# Patient Record
Sex: FEMALE | Race: ASIAN | NOT HISPANIC OR LATINO
[De-identification: names, ages, dates, MRNs, and addresses within clinical notes are randomized per-mention and may not be internally consistent; named-entity substitution may affect disease eponyms.]

---

## 2017-01-23 ENCOUNTER — APPOINTMENT (OUTPATIENT)
Age: 32
End: 2017-01-23

## 2017-01-23 ENCOUNTER — APPOINTMENT (OUTPATIENT)
Dept: UROLOGY | Facility: CLINIC | Age: 32
End: 2017-01-23

## 2017-01-23 VITALS
HEIGHT: 64 IN | WEIGHT: 144 LBS | OXYGEN SATURATION: 95 % | HEART RATE: 72 BPM | DIASTOLIC BLOOD PRESSURE: 67 MMHG | TEMPERATURE: 98.3 F | SYSTOLIC BLOOD PRESSURE: 101 MMHG | RESPIRATION RATE: 17 BRPM | BODY MASS INDEX: 24.59 KG/M2

## 2017-01-23 DIAGNOSIS — R73.03 PREDIABETES.: ICD-10-CM

## 2017-01-23 PROBLEM — Z00.00 ENCOUNTER FOR PREVENTIVE HEALTH EXAMINATION: Status: ACTIVE | Noted: 2017-01-23

## 2017-01-23 LAB
BILIRUB UR QL STRIP: NEGATIVE
CLARITY UR: CLEAR
COLLECTION METHOD: NORMAL
GLUCOSE UR-MCNC: NEGATIVE
HCG UR QL: 0.2 EU/DL
HGB UR QL STRIP.AUTO: NORMAL
KETONES UR-MCNC: NEGATIVE
LEUKOCYTE ESTERASE UR QL STRIP: NORMAL
NITRITE UR QL STRIP: NEGATIVE
PH UR STRIP: 6
PROT UR STRIP-MCNC: NEGATIVE
SP GR UR STRIP: 1.02

## 2017-01-23 RX ORDER — VITAMIN B COMPLEX
TABLET ORAL
Qty: 30 | Refills: 0 | Status: ACTIVE | COMMUNITY
Start: 2016-11-16

## 2017-01-23 RX ORDER — CYCLOBENZAPRINE HYDROCHLORIDE 10 MG/1
10 TABLET, FILM COATED ORAL
Qty: 30 | Refills: 0 | Status: ACTIVE | COMMUNITY
Start: 2016-08-24

## 2017-01-23 RX ORDER — GABAPENTIN 100 MG/1
100 CAPSULE ORAL
Qty: 90 | Refills: 0 | Status: ACTIVE | COMMUNITY
Start: 2016-09-21

## 2017-01-23 RX ORDER — MINERAL OIL AND PETROLATUM 150; 830 MG/G; MG/G
83-15 OINTMENT OPHTHALMIC
Qty: 4 | Refills: 0 | Status: ACTIVE | COMMUNITY
Start: 2016-08-24

## 2017-01-23 RX ORDER — LIDOCAINE AND PRILOCAINE 25; 25 MG/G; MG/G
2.5-2.5 CREAM TOPICAL
Qty: 30 | Refills: 0 | Status: ACTIVE | COMMUNITY
Start: 2016-10-19

## 2017-01-25 ENCOUNTER — APPOINTMENT (OUTPATIENT)
Age: 32
End: 2017-01-25

## 2017-01-25 LAB — BACTERIA UR CULT: NORMAL

## 2017-02-27 ENCOUNTER — APPOINTMENT (OUTPATIENT)
Dept: UROLOGY | Facility: CLINIC | Age: 32
End: 2017-02-27

## 2017-02-27 VITALS
HEART RATE: 78 BPM | SYSTOLIC BLOOD PRESSURE: 100 MMHG | WEIGHT: 140 LBS | DIASTOLIC BLOOD PRESSURE: 67 MMHG | BODY MASS INDEX: 23.9 KG/M2 | OXYGEN SATURATION: 97 % | RESPIRATION RATE: 17 BRPM | HEIGHT: 64 IN | TEMPERATURE: 98.8 F

## 2017-03-30 ENCOUNTER — APPOINTMENT (OUTPATIENT)
Age: 32
End: 2017-03-30

## 2017-03-30 ENCOUNTER — APPOINTMENT (OUTPATIENT)
Dept: UROLOGY | Facility: CLINIC | Age: 32
End: 2017-03-30

## 2017-03-30 VITALS
DIASTOLIC BLOOD PRESSURE: 65 MMHG | BODY MASS INDEX: 23.69 KG/M2 | OXYGEN SATURATION: 97 % | TEMPERATURE: 98.1 F | WEIGHT: 138 LBS | RESPIRATION RATE: 17 BRPM | SYSTOLIC BLOOD PRESSURE: 99 MMHG | HEART RATE: 70 BPM

## 2017-09-28 ENCOUNTER — APPOINTMENT (OUTPATIENT)
Dept: UROLOGY | Facility: CLINIC | Age: 32
End: 2017-09-28
Payer: MEDICAID

## 2017-09-28 VITALS
DIASTOLIC BLOOD PRESSURE: 65 MMHG | WEIGHT: 143 LBS | BODY MASS INDEX: 24.55 KG/M2 | TEMPERATURE: 98.3 F | RESPIRATION RATE: 18 BRPM | HEART RATE: 81 BPM | OXYGEN SATURATION: 96 % | SYSTOLIC BLOOD PRESSURE: 99 MMHG

## 2017-09-28 PROCEDURE — 99213 OFFICE O/P EST LOW 20 MIN: CPT

## 2017-09-28 PROCEDURE — 51798 US URINE CAPACITY MEASURE: CPT

## 2018-07-23 PROBLEM — R73.03 PREDIABETES: Status: RESOLVED | Noted: 2017-01-23 | Resolved: 2018-07-23

## 2018-10-04 ENCOUNTER — APPOINTMENT (OUTPATIENT)
Dept: UROLOGY | Facility: CLINIC | Age: 33
End: 2018-10-04
Payer: MEDICAID

## 2018-10-04 VITALS
OXYGEN SATURATION: 97 % | HEART RATE: 81 BPM | TEMPERATURE: 97.3 F | DIASTOLIC BLOOD PRESSURE: 68 MMHG | RESPIRATION RATE: 18 BRPM | BODY MASS INDEX: 25.75 KG/M2 | SYSTOLIC BLOOD PRESSURE: 102 MMHG | WEIGHT: 150 LBS

## 2018-10-04 PROCEDURE — 99214 OFFICE O/P EST MOD 30 MIN: CPT

## 2019-01-07 ENCOUNTER — APPOINTMENT (OUTPATIENT)
Dept: UROLOGY | Facility: CLINIC | Age: 34
End: 2019-01-07
Payer: MEDICAID

## 2019-01-07 VITALS
OXYGEN SATURATION: 98 % | HEART RATE: 99 BPM | DIASTOLIC BLOOD PRESSURE: 61 MMHG | WEIGHT: 148 LBS | TEMPERATURE: 97.9 F | RESPIRATION RATE: 17 BRPM | BODY MASS INDEX: 25.4 KG/M2 | SYSTOLIC BLOOD PRESSURE: 99 MMHG

## 2019-01-07 PROCEDURE — 51798 US URINE CAPACITY MEASURE: CPT

## 2019-01-07 PROCEDURE — 99213 OFFICE O/P EST LOW 20 MIN: CPT | Mod: 25

## 2019-07-15 ENCOUNTER — APPOINTMENT (OUTPATIENT)
Dept: UROLOGY | Facility: CLINIC | Age: 34
End: 2019-07-15
Payer: MEDICAID

## 2019-07-15 VITALS
HEART RATE: 68 BPM | RESPIRATION RATE: 17 BRPM | WEIGHT: 146 LBS | OXYGEN SATURATION: 96 % | SYSTOLIC BLOOD PRESSURE: 100 MMHG | DIASTOLIC BLOOD PRESSURE: 67 MMHG | TEMPERATURE: 98.3 F | BODY MASS INDEX: 25.06 KG/M2

## 2019-07-15 PROCEDURE — 99213 OFFICE O/P EST LOW 20 MIN: CPT

## 2019-07-15 NOTE — HISTORY OF PRESENT ILLNESS
[FreeTextEntry1] : Patient is a 34 yo F who presents for bothersome urinary frequency, nocturia.  \par \par Previously: Nocturia x5, daytime frequency.  She has reduced fluid intake at night, but without significant change. She has had sleep difficulty for years, takes gabapentin and cyclobenzaprine for back pain and to help with sleep. She states that she is not awaken from sleep to urinate, rather she is still awake and feels need to void. No dysuria, hematuria, incontinence, retention. Had pelvic exam with GYN that was normal.\par \par Interval hx:\par She was given ditropan 10 xl.  Improved nocturia from 5 to 1.  No significant SE, some dry mouth in morning but tolerating well.  She stopped ditropan for 1 year as she was attempting to conceive.  Just delivered a baby boy, in July 2018.  She also notes NICHOLAS since birth of second child.  Both were vaginal deliveries.  Scant leakage with heavy lifting etc, no use of pads.  Improved NICHOLAS.\par \par Since last visit, she stopped ditropan xl for few months.  She is still having nocturia x2-3, but off medication.  No dysuria, hematuria.  She does drink at night-time.

## 2019-07-15 NOTE — ASSESSMENT
[FreeTextEntry1] : Patient is a 32 yo F who presents with urinary frequency/nocturia.  Also with NICHOLAS.\par \par D/w pt kegels exercises for NICHOLAS\par Will cont to hold off on ACh\par F/u PRN

## 2023-08-07 ENCOUNTER — APPOINTMENT (OUTPATIENT)
Dept: UROLOGY | Facility: CLINIC | Age: 38
End: 2023-08-07
Payer: MEDICAID

## 2023-08-07 VITALS
HEIGHT: 64 IN | HEART RATE: 89 BPM | SYSTOLIC BLOOD PRESSURE: 122 MMHG | RESPIRATION RATE: 17 BRPM | OXYGEN SATURATION: 96 % | DIASTOLIC BLOOD PRESSURE: 74 MMHG | BODY MASS INDEX: 28.85 KG/M2 | TEMPERATURE: 97 F | WEIGHT: 169 LBS

## 2023-08-07 LAB
BILIRUB UR QL STRIP: NEGATIVE
CLARITY UR: CLEAR
COLLECTION METHOD: NORMAL
GLUCOSE UR-MCNC: NORMAL
HCG UR QL: 0.2 EU/DL
HGB UR QL STRIP.AUTO: NEGATIVE
KETONES UR-MCNC: NEGATIVE
LEUKOCYTE ESTERASE UR QL STRIP: NEGATIVE
NITRITE UR QL STRIP: NEGATIVE
PH UR STRIP: 5.5
PROT UR STRIP-MCNC: NEGATIVE
SP GR UR STRIP: 1.02

## 2023-08-07 PROCEDURE — 99204 OFFICE O/P NEW MOD 45 MIN: CPT | Mod: 25

## 2023-08-07 PROCEDURE — 81003 URINALYSIS AUTO W/O SCOPE: CPT | Mod: QW

## 2023-08-07 PROCEDURE — 51798 US URINE CAPACITY MEASURE: CPT

## 2023-08-07 NOTE — HISTORY OF PRESENT ILLNESS
[FreeTextEntry1] : Patient is a 38 yo F   x2 who presents for bothersome urinary frequency, nocturia, NICHOLAS.  Previously: Nocturia x5, daytime frequency. She has reduced fluid intake at night, but without significant change. She has had sleep difficulty for years, takes gabapentin and cyclobenzaprine for back pain and to help with sleep.  She was seen in 2019 for similar complaints, had nocturia x 2-5.  She was given ditropan, and had some improvement but she stopped.  She is here still with similar complaints.  No further sig back pain or use of pain/neuro meds.  She is bothered by LUTS - mainly nocturia, frequency, and feeling of need to double void.   DTF x10. She is still having nocturia x2-3.  Also reports NICHOLAS with cough, 2-3 ppds. No dysuria, retention, or hematuria.

## 2023-08-07 NOTE — ASSESSMENT
[FreeTextEntry1] : Patient is a 38 yo F who presents with urinary frequency/nocturia.  Also with NICHOLAS.  Previously seen with similar complaints.  NICHOLAS appears worse as now using pads.  [facility] pt [facility], will again give trial of anticholinergics.  Rx sent.  Counseled on proper use and SE profile, including dry mouth/eyes/throat, constipation.  PVR today is low  F/u Dr Garcia

## 2023-09-06 ENCOUNTER — APPOINTMENT (OUTPATIENT)
Dept: UROLOGY | Facility: CLINIC | Age: 38
End: 2023-09-06
Payer: MEDICAID

## 2023-09-06 VITALS
SYSTOLIC BLOOD PRESSURE: 113 MMHG | OXYGEN SATURATION: 97 % | WEIGHT: 169.5 LBS | BODY MASS INDEX: 28.94 KG/M2 | DIASTOLIC BLOOD PRESSURE: 75 MMHG | HEART RATE: 84 BPM | RESPIRATION RATE: 18 BRPM | TEMPERATURE: 97.2 F | HEIGHT: 64.17 IN

## 2023-09-06 PROCEDURE — 99215 OFFICE O/P EST HI 40 MIN: CPT | Mod: 25

## 2023-09-06 PROCEDURE — 51798 US URINE CAPACITY MEASURE: CPT

## 2023-09-06 NOTE — HISTORY OF PRESENT ILLNESS
[FreeTextEntry1] : 37yrs P2 () F Mandarin speaker for which we used an , she comes referred from Dr. Tirado. Comes with symptoms of urinary frequency and feeling of incomplete void.  5+ years    Number; Lara                   Name: [id number]  DTF: q 1/2 hr. 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

## 2023-09-06 NOTE — ASSESSMENT
[FreeTextEntry1] : dysfunctional voiding - will start tamsulosin 0.4 mg qhs  re light headedness  NICHOLAS / dysfunctional voiding - refer to PFPT  Nocturia - assess if improvement with outlet medication   RTO 3 mo.

## 2023-09-06 NOTE — PHYSICAL EXAM
[General Appearance - Well Developed] : well developed [Normal Appearance] : normal appearance [General Appearance - In No Acute Distress] : no acute distress [Abdomen Soft] : soft [Abdomen Tenderness] : non-tender [FreeTextEntry1] : neg cst, no prolapse, mild levator tenderness b/l - 5 and 7 o clock

## 2023-12-20 ENCOUNTER — APPOINTMENT (OUTPATIENT)
Dept: UROLOGY | Facility: CLINIC | Age: 38
End: 2023-12-20
Payer: MEDICAID

## 2023-12-20 VITALS
DIASTOLIC BLOOD PRESSURE: 65 MMHG | WEIGHT: 169 LBS | RESPIRATION RATE: 18 BRPM | TEMPERATURE: 98 F | HEART RATE: 72 BPM | OXYGEN SATURATION: 97 % | SYSTOLIC BLOOD PRESSURE: 95 MMHG | BODY MASS INDEX: 28.86 KG/M2

## 2023-12-20 PROCEDURE — 99215 OFFICE O/P EST HI 40 MIN: CPT

## 2023-12-20 RX ORDER — OXYBUTYNIN CHLORIDE 10 MG/1
10 TABLET, EXTENDED RELEASE ORAL
Qty: 30 | Refills: 2 | Status: COMPLETED | COMMUNITY
Start: 2017-02-27 | End: 2023-12-20

## 2023-12-20 RX ORDER — OXYBUTYNIN CHLORIDE 5 MG/1
5 TABLET, EXTENDED RELEASE ORAL
Qty: 30 | Refills: 1 | Status: COMPLETED | COMMUNITY
Start: 2017-01-23 | End: 2023-12-20

## 2023-12-20 RX ORDER — OXYBUTYNIN CHLORIDE 10 MG/1
10 TABLET, EXTENDED RELEASE ORAL
Qty: 30 | Refills: 5 | Status: COMPLETED | COMMUNITY
Start: 2018-10-04 | End: 2023-12-20

## 2023-12-20 RX ORDER — OXYBUTYNIN CHLORIDE 5 MG/1
5 TABLET, EXTENDED RELEASE ORAL
Qty: 30 | Refills: 0 | Status: COMPLETED | COMMUNITY
Start: 2023-08-07 | End: 2023-12-20

## 2023-12-20 NOTE — HISTORY OF PRESENT ILLNESS
[FreeTextEntry1] : 38 yrs P2 () F pmhx DM, Mandarin speaker for which we used an  here for follow up for dysfunctional voiding. Comes with symptoms of urinary frequency, nocturia and feeling of incomplete voiding for 5+ years. She was started on Flomax 0.4 mg and PFPT at her last visit 3 months ago. States she was only given one month of Flomax from the pharmacy so is not on it right now. Does report she had some improvement in symptoms on the medication. She also is on the waitlist and has not started PFPT yet.    ID [id number]  Symptoms otherwise remain the same off medication.  DTF improved to q 1/2 hr. Nocturia:4-5 times  UUI: No  NICHOLAS: yes, with laughing, sneezing and coughing. 2-3 times / week PPD: no  Some straining to urinate, some hesitancy, some double voiding  Intake:6232-6075 ml water, occasionally tea, sometimes soda, no alcohol No constipation  Prior intervention: Oxybutynin (no regularly)    PVR at last office visit was 0 cc

## 2023-12-20 NOTE — END OF VISIT
[FreeTextEntry3] : She is on the wait list for [facility] for NICHOLAS and dysfunctional voiding  SHe had some benefit from the tamsulosin 0.4 mg qhs but pharmacy only provided 30 days resent rx she will resume and titrate up to 0.8 mg qhs after a week if seeing marginal benefits  RTO 6 mo.  The total time spent with the patient includes face to face time as well as time for documentation, ordering medications/labs/procedures, and care coordination, but I acknowledge it does not include time spent on any procedures performed (eg PVR, UDS, Cystoscopy, catheter changes, etc).  Time includes reviewing the chart prior to visit, documentation, and correspondence. [Time Spent: ___ minutes] : I have spent [unfilled] minutes of time on the encounter.

## 2023-12-20 NOTE — PHYSICAL EXAM
[Normal Appearance] : normal appearance [Well Groomed] : well groomed [General Appearance - In No Acute Distress] : no acute distress [Edema] : no peripheral edema [] : no respiratory distress [Respiration, Rhythm And Depth] : normal respiratory rhythm and effort [Exaggerated Use Of Accessory Muscles For Inspiration] : no accessory muscle use [Abdomen Soft] : soft [Abdomen Tenderness] : non-tender [Costovertebral Angle Tenderness] : no ~M costovertebral angle tenderness [Urinary Bladder Findings] : the bladder was normal on palpation [Normal Station and Gait] : the gait and station were normal for the patient's age [Affect] : the affect was normal [Mood] : the mood was normal [Not Anxious] : not anxious

## 2023-12-20 NOTE — ASSESSMENT
[FreeTextEntry1] : 38 year old female with dysfunctional voiding here for follow up. Will renew Flomax and have her start PFPT.   Plan:  - Flomax 0.4 mg qhs  - refill sent - start PFPT

## 2024-06-12 ENCOUNTER — APPOINTMENT (OUTPATIENT)
Dept: UROLOGY | Facility: CLINIC | Age: 39
End: 2024-06-12
Payer: COMMERCIAL

## 2024-06-12 VITALS
BODY MASS INDEX: 26.29 KG/M2 | HEART RATE: 67 BPM | WEIGHT: 154 LBS | DIASTOLIC BLOOD PRESSURE: 72 MMHG | RESPIRATION RATE: 18 BRPM | SYSTOLIC BLOOD PRESSURE: 107 MMHG

## 2024-06-12 DIAGNOSIS — N39.3 STRESS INCONTINENCE (FEMALE) (MALE): ICD-10-CM

## 2024-06-12 DIAGNOSIS — R35.0 FREQUENCY OF MICTURITION: ICD-10-CM

## 2024-06-12 DIAGNOSIS — N39.8 OTHER SPECIFIED DISORDERS OF URINARY SYSTEM: ICD-10-CM

## 2024-06-12 DIAGNOSIS — R35.1 NOCTURIA: ICD-10-CM

## 2024-06-12 PROCEDURE — 51798 US URINE CAPACITY MEASURE: CPT

## 2024-06-12 PROCEDURE — 99213 OFFICE O/P EST LOW 20 MIN: CPT

## 2024-06-12 PROCEDURE — G2211 COMPLEX E/M VISIT ADD ON: CPT | Mod: NC,1L

## 2024-06-12 RX ORDER — TAMSULOSIN HYDROCHLORIDE 0.4 MG/1
0.4 CAPSULE ORAL
Qty: 90 | Refills: 3 | Status: ACTIVE | COMMUNITY
Start: 2023-09-06 | End: 1900-01-01

## 2024-06-12 NOTE — ASSESSMENT
[FreeTextEntry1] : dysfunctional voiding resent tamsulosin to new pharmacy tamusloin 0.4 mg qhs - if pt gets some benefit, she can increase to 0.8 mg qhs   reviewed side effects  PFPT - encouraged to perform exercises at home   OAB - will consider 3rd line therapies if no improvement or increase tamsulosin to 0.8 mg qhs    RTO 1-2 mo .

## 2024-06-12 NOTE — PHYSICAL EXAM
[General Appearance - Well Developed] : well developed [General Appearance - Well Nourished] : well nourished [Normal Appearance] : normal appearance [Well Groomed] : well groomed [General Appearance - In No Acute Distress] : no acute distress [] : no respiratory distress [Urinary Bladder Findings] : the bladder was normal on palpation [Normal Station and Gait] : the gait and station were normal for the patient's age [Skin Color & Pigmentation] : normal skin color and pigmentation [Affect] : the affect was normal [Mood] : the mood was normal [Not Anxious] : not anxious

## 2024-06-12 NOTE — HISTORY OF PRESENT ILLNESS
[FreeTextEntry1] : 38 yrs P2 () F pmhx dysfunctional voiding and oab She had early success with Tamsulosin but did not fill at last visit 2/2 pharmacy change She went to PFPT 8-10 at [facility] with benefit but has not had the time to do exercise  DTF  30-45 min   Nocturia:  3-4 UUI: No  NICHOLAS: yes, with laughing, sneezing and coughing. 2x / week   PPD: no  Some straining to urinate, some hesitancy, some double voiding Spends a long time on the toilet   Prior intervention: Oxybutynin (no regularly)     PVR = 0 cc

## 2024-08-07 ENCOUNTER — APPOINTMENT (OUTPATIENT)
Dept: UROLOGY | Facility: CLINIC | Age: 39
End: 2024-08-07

## 2024-08-07 PROCEDURE — G2211 COMPLEX E/M VISIT ADD ON: CPT | Mod: NC,1L

## 2024-08-07 PROCEDURE — 99204 OFFICE O/P NEW MOD 45 MIN: CPT

## 2024-08-07 NOTE — ASSESSMENT
[FreeTextEntry1] : Plan:  Increase Tamsulosin to 0.8mg po QHS.   Encouraged to perform PFPT exercises at home, pt would like to return to [facility] rehab for more sessions.  Paper referral provided.   OAB - discussed of 3rd line therapies  pt is leaning towards PTNS however, still has fear.   will let team know once decision is made.  RTO 3 months.

## 2024-08-07 NOTE — HISTORY OF PRESENT ILLNESS
[FreeTextEntry1] : 38 yr old female patient with dx of dysfunctional voiding and OAB presents to office today for follow up. Currently on Tamsulosin 0.4mg po qhs.  Had 8-10 sessions of PFPT at [facility] with benefits. But do not practice at home.   DTF [id number] Nocturia 2x (previously 3-4x) Denies of UUI Occasional NICHOLAS with laughing/sneeze  Do not wear pads/liner do not require to change underwear sometimes strain to void, needs spend long time on toilet  PVR in June 2024 - 0cc

## 2024-08-07 NOTE — PHYSICAL EXAM
[General Appearance - Well Developed] : well developed [Normal Appearance] : normal appearance [Edema] : no peripheral edema [Abdomen Soft] : soft [Normal Station and Gait] : the gait and station were normal for the patient's age [] : no rash [Not Anxious] : not anxious

## 2024-11-20 ENCOUNTER — APPOINTMENT (OUTPATIENT)
Dept: UROLOGY | Facility: CLINIC | Age: 39
End: 2024-11-20
Payer: COMMERCIAL

## 2024-11-20 VITALS
TEMPERATURE: 98 F | OXYGEN SATURATION: 97 % | SYSTOLIC BLOOD PRESSURE: 106 MMHG | DIASTOLIC BLOOD PRESSURE: 64 MMHG | BODY MASS INDEX: 26.47 KG/M2 | RESPIRATION RATE: 18 BRPM | HEART RATE: 81 BPM | WEIGHT: 155 LBS

## 2024-11-20 DIAGNOSIS — R35.1 NOCTURIA: ICD-10-CM

## 2024-11-20 DIAGNOSIS — N39.8 OTHER SPECIFIED DISORDERS OF URINARY SYSTEM: ICD-10-CM

## 2024-11-20 DIAGNOSIS — N39.3 STRESS INCONTINENCE (FEMALE) (MALE): ICD-10-CM

## 2024-11-20 DIAGNOSIS — R35.0 FREQUENCY OF MICTURITION: ICD-10-CM

## 2024-11-20 PROCEDURE — G2211 COMPLEX E/M VISIT ADD ON: CPT | Mod: NC

## 2024-11-20 PROCEDURE — 99214 OFFICE O/P EST MOD 30 MIN: CPT

## 2025-03-12 ENCOUNTER — RX RENEWAL (OUTPATIENT)
Age: 40
End: 2025-03-12

## 2025-07-02 ENCOUNTER — APPOINTMENT (OUTPATIENT)
Dept: UROLOGY | Facility: CLINIC | Age: 40
End: 2025-07-02
Payer: COMMERCIAL

## 2025-07-02 ENCOUNTER — RESULT CHARGE (OUTPATIENT)
Age: 40
End: 2025-07-02

## 2025-07-02 VITALS
SYSTOLIC BLOOD PRESSURE: 100 MMHG | DIASTOLIC BLOOD PRESSURE: 69 MMHG | WEIGHT: 148.7 LBS | RESPIRATION RATE: 18 BRPM | BODY MASS INDEX: 25.39 KG/M2 | TEMPERATURE: 97.9 F | OXYGEN SATURATION: 97 % | HEART RATE: 78 BPM

## 2025-07-02 PROCEDURE — 99214 OFFICE O/P EST MOD 30 MIN: CPT

## 2025-07-02 PROCEDURE — 51798 US URINE CAPACITY MEASURE: CPT

## 2025-07-02 PROCEDURE — G2211 COMPLEX E/M VISIT ADD ON: CPT | Mod: NC

## 2025-07-02 RX ORDER — POLYETHYLENE GLYCOL 3350 17 G/17G
17 POWDER, FOR SOLUTION ORAL DAILY
Qty: 510 | Refills: 0 | Status: ACTIVE | COMMUNITY
Start: 2025-07-02 | End: 1900-01-01